# Patient Record
Sex: FEMALE | Race: BLACK OR AFRICAN AMERICAN | NOT HISPANIC OR LATINO | Employment: PART TIME | URBAN - METROPOLITAN AREA
[De-identification: names, ages, dates, MRNs, and addresses within clinical notes are randomized per-mention and may not be internally consistent; named-entity substitution may affect disease eponyms.]

---

## 2017-09-28 ENCOUNTER — HOSPITAL ENCOUNTER (OUTPATIENT)
Dept: RADIOLOGY | Facility: HOSPITAL | Age: 67
Discharge: HOME | End: 2017-09-28
Attending: PODIATRIST
Payer: MEDICARE

## 2017-09-28 ENCOUNTER — OFFICE VISIT (OUTPATIENT)
Dept: PODIATRY | Facility: CLINIC | Age: 67
End: 2017-09-28
Payer: MEDICARE

## 2017-09-28 VITALS
BODY MASS INDEX: 36.09 KG/M2 | DIASTOLIC BLOOD PRESSURE: 79 MMHG | WEIGHT: 203.69 LBS | HEART RATE: 79 BPM | HEIGHT: 63 IN | SYSTOLIC BLOOD PRESSURE: 128 MMHG

## 2017-09-28 DIAGNOSIS — S93.509A TOE SPRAIN, INITIAL ENCOUNTER: Primary | ICD-10-CM

## 2017-09-28 DIAGNOSIS — M79.672 LEFT FOOT PAIN: Primary | ICD-10-CM

## 2017-09-28 DIAGNOSIS — E11.9 COMPREHENSIVE DIABETIC FOOT EXAMINATION, TYPE 2 DM, ENCOUNTER FOR: ICD-10-CM

## 2017-09-28 DIAGNOSIS — M79.672 LEFT FOOT PAIN: ICD-10-CM

## 2017-09-28 PROCEDURE — 73630 X-RAY EXAM OF FOOT: CPT | Mod: TC,PO,LT

## 2017-09-28 PROCEDURE — 99999 PR PBB SHADOW E&M-NEW PATIENT-LVL III: CPT | Mod: PBBFAC,,, | Performed by: PODIATRIST

## 2017-09-28 PROCEDURE — 99203 OFFICE O/P NEW LOW 30 MIN: CPT | Mod: S$GLB,,, | Performed by: PODIATRIST

## 2017-09-28 PROCEDURE — 73630 X-RAY EXAM OF FOOT: CPT | Mod: 26,LT,, | Performed by: RADIOLOGY

## 2017-09-28 RX ORDER — TRIAMCINOLONE ACETONIDE 55 UG/1
1 SPRAY, METERED NASAL
COMMUNITY
Start: 2017-03-07

## 2017-09-28 RX ORDER — ATORVASTATIN CALCIUM 40 MG/1
40 TABLET, FILM COATED ORAL
COMMUNITY
Start: 2017-05-22 | End: 2018-05-22

## 2017-09-28 RX ORDER — LAMOTRIGINE 25 MG/1
TABLET ORAL
COMMUNITY
Start: 2017-08-27 | End: 2024-01-02

## 2017-09-28 RX ORDER — PROPRANOLOL HYDROCHLORIDE 80 MG/1
TABLET ORAL
COMMUNITY
Start: 2017-08-21

## 2017-09-28 RX ORDER — ALBUTEROL SULFATE 90 UG/1
2 AEROSOL, METERED RESPIRATORY (INHALATION)
COMMUNITY
Start: 2016-07-07

## 2017-09-28 RX ORDER — CLONIDINE HYDROCHLORIDE 0.3 MG/1
0.3 TABLET ORAL
COMMUNITY
Start: 2016-09-02

## 2017-09-28 RX ORDER — POTASSIUM CHLORIDE 750 MG/1
10 TABLET, EXTENDED RELEASE ORAL
COMMUNITY
Start: 2017-05-01

## 2017-09-28 RX ORDER — IBUPROFEN 600 MG/1
600 TABLET ORAL EVERY 8 HOURS PRN
Qty: 30 TABLET | Refills: 0 | Status: SHIPPED | OUTPATIENT
Start: 2017-09-28

## 2017-09-28 RX ORDER — DEXLANSOPRAZOLE 60 MG/1
CAPSULE, DELAYED RELEASE ORAL
COMMUNITY
Start: 2017-07-17

## 2017-09-28 RX ORDER — ASPIRIN 81 MG/1
81 TABLET ORAL
COMMUNITY

## 2017-09-28 RX ORDER — AZELASTINE HCL 205.5 UG/1
205.5 SPRAY NASAL
COMMUNITY
Start: 2017-05-01

## 2017-09-28 RX ORDER — CLONAZEPAM 0.5 MG/1
TABLET ORAL
COMMUNITY
Start: 2017-08-15

## 2017-09-28 RX ORDER — MONTELUKAST SODIUM 10 MG/1
10 TABLET ORAL
COMMUNITY
Start: 2017-01-04 | End: 2018-01-04

## 2017-09-28 RX ORDER — LURASIDONE HYDROCHLORIDE 20 MG/1
100 TABLET, FILM COATED ORAL
COMMUNITY
Start: 2015-12-29 | End: 2021-12-28 | Stop reason: SDUPTHER

## 2017-09-28 RX ORDER — INSULIN ASPART 100 [IU]/ML
35 INJECTION, SOLUTION INTRAVENOUS; SUBCUTANEOUS
COMMUNITY
Start: 2016-02-29

## 2017-09-28 RX ORDER — LURASIDONE HYDROCHLORIDE 80 MG/1
TABLET, FILM COATED ORAL
COMMUNITY
Start: 2016-08-23

## 2017-09-28 RX ORDER — HYDROCHLOROTHIAZIDE 25 MG/1
TABLET ORAL
COMMUNITY
Start: 2017-08-19

## 2017-09-28 RX ORDER — AMITRIPTYLINE HYDROCHLORIDE 100 MG/1
TABLET ORAL
COMMUNITY
Start: 2017-07-17

## 2017-09-28 RX ORDER — LAMOTRIGINE 200 MG/1
TABLET ORAL
COMMUNITY
Start: 2017-08-27

## 2017-09-28 NOTE — PROGRESS NOTES
[facility] -   PODIATRIC MEDICINE AND SURGERY  PROGRESS NOTE  10/3/2017    PODIATRY NOTE  PCP: Dr. Augustin Rosales MD    CHIEF COMPLAINT   Chief Complaint   Patient presents with    Foot Pain     Left hallux pain. Mild swelling noted. X-rays at [facility] negative for fracture per pt. Completed Clindamycin 9/27/17       HPI  Veronica Lara is a 66 y.o. female who has a past medical history of Anxiety; Depression; Diabetes mellitus, type 2; and Hypertension.   Veronica presents to clinic today complaining of left great toe pain.    Patient describes pain as:   Location:left great toe  Quality: throbbing  Severity:8/10  Duration: two weeks; pt states she dropped a can on her toe two weeks ago. She went to [facility] and had xrays performed which was negative. She has been icing the area which has helped. She is taking pain medication. Symptoms are worse with ambulation and pressure.     Patient denies other pedal complaints at this time.    PCP: Dr. Rosales Last Visit: 9/2017    [facility]  Past Medical History:   Diagnosis Date    Anxiety     Depression     Diabetes mellitus, type 2     Hypertension        PROBLEM LIST  There are no active problems to display for this patient.      MEDS  No current outpatient prescriptions on file prior to visit.     No current facility-administered medications on file prior to visit.        Medication List with Changes/Refills   New Medications    IBUPROFEN (ADVIL,MOTRIN) 600 MG TABLET    Take 1 tablet (600 mg total) by mouth every 8 (eight) hours as needed for Pain.   Current Medications    ALBUTEROL 90 MCG/ACTUATION INHALER    Inhale 2 puffs into the lungs.    AMITRIPTYLINE (ELAVIL) 100 MG TABLET    TAKE 1 TABLET BY MOUTH NIGHTLY.    ASPIRIN (ECOTRIN) 81 MG EC TABLET    Take 81 mg by mouth.    ATORVASTATIN (LIPITOR) 40 MG TABLET    Take 40 mg by mouth.    AZELASTINE 0.15 % (205.5 MCG) SPRY    205.5 mcg by Nasal route.    CLONAZEPAM (KLONOPIN) 0.5 MG TABLET        CLONIDINE  "(CATAPRES) 0.3 MG TABLET    Take 0.3 mg by mouth.    DEXLANSOPRAZOLE (DEXILANT) 60 MG CAPSULE    TAKE 1 CAPSULE BY MOUTH DAILY.    HYDROCHLOROTHIAZIDE (HYDRODIURIL) 25 MG TABLET        INSULIN ASPART (NOVOLOG) 100 UNIT/ML INPN PEN    Inject 35 Units into the skin.    LAMOTRIGINE (LAMICTAL) 200 MG TABLET        LAMOTRIGINE (LAMICTAL) 25 MG TABLET        LINAGLIPTIN-METFORMIN (JENTADUETO) 2.5-1,000 MG TAB    TAKE 1 TABLET BY MOUTH 2 (TWO) TIMES DAILY.    LURASIDONE (LATUDA) 20 MG TAB TABLET    Take 100 mg by mouth.    LURASIDONE (LATUDA) 80 MG TAB TABLET    TAKE 1 TABLET BY MOUTH EVERY EVENING BETWEEN MEALS    MONTELUKAST (SINGULAIR) 10 MG TABLET    Take 10 mg by mouth.    POTASSIUM CHLORIDE SA (K-DUR,KLOR-CON) 10 MEQ TABLET    Take 10 mEq by mouth.    PROPRANOLOL (INDERAL) 80 MG TABLET    TAKE 1 TABLET BY MOUTH 2 (TWO) TIMES DAILY.    TRIAMCINOLONE (NASACORT) 55 MCG NASAL INHALER    1 spray.       PSH   History reviewed. No pertinent surgical history.     ALL  Review of patient's allergies indicates:  No Known Allergies    SOC     Social History   Substance Use Topics    Smoking status: Never Smoker    Smokeless tobacco: Never Used    Alcohol use Not on file         FAMILY HX  History reviewed. No pertinent family history.         REVIEW OF SYSTEMS  Constitutional: Negative for chills and fever.   Respiratory: Negative for shortness of breath.    Cardiovascular: Negative for chest pain, palpitations, orthopnea, claudication and leg swelling.   Gastrointestinal: Negative for diarrhea, nausea and vomiting.   Musculoskeletal: positive for joint pain   Skin: Negative for rash.   Neurological: Negative for dizziness, tingling, sensory change, focal weakness and weakness.   Psychiatric/Behavioral: Negative.    PHYSICAL EXAM  Vitals:    09/28/17 1620   BP: 128/79   Pulse: 79   Weight: 92.4 kg (203 lb 11.3 oz)   Height: 5' 3" (1.6 m)   PainSc: 0-No pain       General: This patient is well-developed, well-nourished and " appears stated age, well-oriented to person, place and time, and cooperative and pleasant on today's visit      LOWER EXTREMITY  Vascular exam:   · Dorsalis pedis and posterior tibial pulses palpable 2/4 bilaterally.   · Capillary refill time immediate to the toes.   · Feet are warm to the touch. Skin temperature warm to warm from proximally to distally   · There are no varicosities, telangiectasias noted to bilateral foot and ankle regions.   · There are no ecchymoses noted to bilateral foot and ankle regions.   · There is mild edema left hallux    Dermatologic exam:   · Skin moist with healthy texture and turgor.  · There are no open ulcerations, lacerations, or fissures to bilateral foot and ankle regions. There are no signs of infection as there is no erythema, no proximal-extending lymphangiitis, no fluctuance, or crepitus noted on palpation to bilateral foot and ankle regions.   · There is no interdigital maceration.   · There are no hyperkeratotic lesions noted to feet. Nails are well-trimmed.    Neurologic exam:  · Epicritic sensation is intact as the patient is able to sense light touch to bilateral foot and ankle regions.   · Achilles and patellar deep tendon reflexes intact  · Babinski reflex absent    Musculoskeletal/Orthopedic exam:   · No symptomatic structural abnormalities noted  · Muscle strength AT/EHL/EDL/PT: 5/5; Achilles/Gastroc/Soleus: 5/5; PB/PL: 5/5 Muscle tone is normal.  · Ankle joint ROM  B/L supple DF/PF, non-crepitus  · STJ ROM supple inv/ev, non crepitus   · There is pain with palpation of left hallyx    IMAGING   Reviewed by me and I agree with radiologist findings, 3 views of foot/ankle, reveal: negative for fracture or dislocation      ASSESSMENT  Encounter Diagnoses   Name Primary?    Toe sprain, initial encounter - Left Foot Yes    Comprehensive diabetic foot examination, type 2 DM, encounter for          PLAN  1. Patient was educated about clinical and imaging findings, and  verbalizes understanding of above.     Diagnoses and all orders for this visit:  Toe sprain, initial encounter - Left Foot    Comprehensive diabetic foot examination, type 2 DM, encounter for    Other orders  -     ibuprofen (ADVIL,MOTRIN) 600 MG tablet; Take 1 tablet (600 mg total) by mouth every 8 (eight) hours as needed for Pain.  Dispense: 30 tablet; Refill: 0      2. Treatment plan: Discussed toe sprain, PRICE therapy, and surgical shoe. Instructed patient on buddy splintage of affected digit for 3 weeks. Ibuprofen PRN pain. If symptoms persists, return to clinic.     3. RTC  for follow up/evaluation as scheduled       No future appointments.    Report Electronically Signed By:  Nan Patrick DPM   Podiatric Medicine & Surgery  [facility] Panchal  10/3/2017

## 2017-11-17 ENCOUNTER — TELEPHONE (OUTPATIENT)
Dept: PODIATRY | Facility: CLINIC | Age: 67
End: 2017-11-17

## 2017-11-17 NOTE — TELEPHONE ENCOUNTER
Ms. Veronica Lara was given a return call, and informed that her appointment on 12/11/2017 at the [facility] location for 2:20pm is Dr. Cardoso next available appointment. Ms. Marco verbalized understanding, and the call ended well.

## 2017-11-17 NOTE — TELEPHONE ENCOUNTER
----- Message from Miriam Barron sent at 11/17/2017 12:10 PM CST -----  pls work pt in before 12/11 (not getting any better), doesn't want to see deshawn..[phone] (home)

## 2017-12-11 ENCOUNTER — LAB VISIT (OUTPATIENT)
Dept: LAB | Facility: HOSPITAL | Age: 67
End: 2017-12-11
Attending: PODIATRIST
Payer: MEDICARE

## 2017-12-11 ENCOUNTER — OFFICE VISIT (OUTPATIENT)
Dept: PODIATRY | Facility: CLINIC | Age: 67
End: 2017-12-11
Payer: MEDICARE

## 2017-12-11 VITALS
WEIGHT: 205.81 LBS | HEIGHT: 63 IN | SYSTOLIC BLOOD PRESSURE: 150 MMHG | HEART RATE: 82 BPM | BODY MASS INDEX: 36.46 KG/M2 | DIASTOLIC BLOOD PRESSURE: 87 MMHG

## 2017-12-11 DIAGNOSIS — M10.072 IDIOPATHIC GOUT OF LEFT FOOT, UNSPECIFIED CHRONICITY: ICD-10-CM

## 2017-12-11 DIAGNOSIS — S90.112S CONTUSION OF LEFT GREAT TOE WITHOUT DAMAGE TO NAIL, SEQUELA: ICD-10-CM

## 2017-12-11 DIAGNOSIS — M10.072 IDIOPATHIC GOUT OF LEFT FOOT, UNSPECIFIED CHRONICITY: Primary | ICD-10-CM

## 2017-12-11 LAB
CRP SERPL-MCNC: 2.8 MG/L
ERYTHROCYTE [SEDIMENTATION RATE] IN BLOOD BY WESTERGREN METHOD: 28 MM/HR
URATE SERPL-MCNC: 7.5 MG/DL

## 2017-12-11 PROCEDURE — 84550 ASSAY OF BLOOD/URIC ACID: CPT

## 2017-12-11 PROCEDURE — 86140 C-REACTIVE PROTEIN: CPT

## 2017-12-11 PROCEDURE — 99999 PR PBB SHADOW E&M-EST. PATIENT-LVL III: CPT | Mod: PBBFAC,,, | Performed by: PODIATRIST

## 2017-12-11 PROCEDURE — 99213 OFFICE O/P EST LOW 20 MIN: CPT | Mod: S$GLB,,, | Performed by: PODIATRIST

## 2017-12-11 PROCEDURE — 36415 COLL VENOUS BLD VENIPUNCTURE: CPT

## 2017-12-11 PROCEDURE — 85651 RBC SED RATE NONAUTOMATED: CPT

## 2017-12-11 NOTE — PROGRESS NOTES
Subjective:     Patient ID: Veronica Lara is a 67 y.o. female.    Chief Complaint: Toe Pain (diabetic patient, left great toe, pain was described as throbbing, patient stated a can fell on her toe about 7mths ago)    Veronica is a 67 y.o. female who presents to the podiatry clinic  with complaint of  left foot pain. Onset of the symptoms was several months ago. Precipitating event: dropped a can a green beans about 3-4 months ago. Current symptoms include: ability to bear weight, but with some pain and swelling. Aggravating factors: standing and walking. Symptoms have been intermittent. Patient has had no prior foot problems. Evaluation to date: plain films: normal. Treatment to date:ice and ibuprofen. Patients rates pain 3/10 on pain scale.        Patient Active Problem List   Diagnosis    Anxiety    Depression    Hypertension    Diabetes mellitus, type 2       Medication List with Changes/Refills   Current Medications    ALBUTEROL 90 MCG/ACTUATION INHALER    Inhale 2 puffs into the lungs.    AMITRIPTYLINE (ELAVIL) 100 MG TABLET    TAKE 1 TABLET BY MOUTH NIGHTLY.    ASPIRIN (ECOTRIN) 81 MG EC TABLET    Take 81 mg by mouth.    ATORVASTATIN (LIPITOR) 40 MG TABLET    Take 40 mg by mouth.    AZELASTINE 0.15 % (205.5 MCG) SPRY    205.5 mcg by Nasal route.    CLONAZEPAM (KLONOPIN) 0.5 MG TABLET        CLONIDINE (CATAPRES) 0.3 MG TABLET    Take 0.3 mg by mouth.    DEXLANSOPRAZOLE (DEXILANT) 60 MG CAPSULE    TAKE 1 CAPSULE BY MOUTH DAILY.    HYDROCHLOROTHIAZIDE (HYDRODIURIL) 25 MG TABLET        IBUPROFEN (ADVIL,MOTRIN) 600 MG TABLET    Take 1 tablet (600 mg total) by mouth every 8 (eight) hours as needed for Pain.    INSULIN ASPART (NOVOLOG) 100 UNIT/ML INPN PEN    Inject 35 Units into the skin.    LAMOTRIGINE (LAMICTAL) 200 MG TABLET        LAMOTRIGINE (LAMICTAL) 25 MG TABLET        LINAGLIPTIN-METFORMIN (JENTADUETO) 2.5-1,000 MG TAB    TAKE 1 TABLET BY MOUTH 2 (TWO) TIMES DAILY.    LURASIDONE (LATUDA) 20 MG TAB TABLET     "Take 100 mg by mouth.    LURASIDONE (LATUDA) 80 MG TAB TABLET    TAKE 1 TABLET BY MOUTH EVERY EVENING BETWEEN MEALS    MONTELUKAST (SINGULAIR) 10 MG TABLET    Take 10 mg by mouth.    POTASSIUM CHLORIDE SA (K-DUR,KLOR-CON) 10 MEQ TABLET    Take 10 mEq by mouth.    PROPRANOLOL (INDERAL) 80 MG TABLET    TAKE 1 TABLET BY MOUTH 2 (TWO) TIMES DAILY.    TRIAMCINOLONE (NASACORT) 55 MCG NASAL INHALER    1 spray.       Review of patient's allergies indicates:  No Known Allergies    History reviewed. No pertinent surgical history.    History reviewed. No pertinent family history.    Social History     Social History    Marital status:      Spouse name: N/A    Number of children: N/A    Years of education: N/A     Occupational History    Not on file.     Social History Main Topics    Smoking status: Never Smoker    Smokeless tobacco: Never Used    Alcohol use Not on file    Drug use: Unknown    Sexual activity: Not on file     Other Topics Concern    Not on file     Social History Narrative    No narrative on file       Vitals:    12/11/17 1533   BP: (!) 150/87   Pulse: 82   Weight: 93.3 kg (205 lb 12.8 oz)   Height: 5' 3" (1.6 m)   PainSc:   7       Review of Systems   Constitutional: Negative for chills and fever.   Respiratory: Negative for shortness of breath.    Cardiovascular: Negative for chest pain, palpitations, orthopnea, claudication and leg swelling.   Gastrointestinal: Negative for diarrhea, nausea and vomiting.   Musculoskeletal: Negative for joint pain.   Skin: Negative for rash.   Neurological: Negative for dizziness, tingling, sensory change, focal weakness and weakness.   Psychiatric/Behavioral: Negative.          Objective:       PHYSICAL EXAM: Apperance: Alert and orient in no distress,well developed, and with good attention to grooming and body habits  Patient presents ambulating in causal shoes.   Lower Extremity Physical Exam:  VASCULAR: Dorsalis pedis pulses 2/4 bilateral and Posterior " Tibial pulses 2/4 bilateral. Capillary fill time <4 seconds bilateral. Minimal edema observed bilateral. Varicosities absent bilateral. Skin temperature of the lower extremities is warm to warm, proximal to distal. Hair growth WNL bilateral.  DERMATOLOGICAL: No skin rashes, subcutaneous nodules, lesions, or ulcers observed bilateral. (+)edema left 1st MPJ  NEUROLOGICAL: Light touch, sharp-dull, proprioception all present and equal bilaterally.    MUSCULOSKELETAL: Muscle strength is 5/5 for foot inverters, everters, plantarflexors, and dorsiflexors. Muscle tone is normal. (+) pain on palpation of left 1st MPJ ROM.          Assessment:       Encounter Diagnoses   Name Primary?    Idiopathic gout of left foot, unspecified chronicity Yes    Contusion of left great toe without damage to nail, sequela          Plan:   Idiopathic gout of left foot, unspecified chronicity  -     Uric acid; Future; Expected date: 12/11/2017  -     C-reactive protein; Future; Expected date: 12/11/2017  -     Sedimentation rate, manual; Future; Expected date: 12/11/2017    Contusion of left great toe without damage to nail, sequela      I counseled the patient on her conditions, regarding findings of my examination, my impressions, and usual treatment plan.   I explained to the patient that etiologies and treatment options for gout including rest, elevation, diet control, NSAID's, long term gout medication, and/or injection therapy.  Prescription written for Colchicine 0.6mg to be taken as prescribed.  Ordered uric acid, crp, and esr testing to be completed today.    Patient to return in 6 weeks or sooner if needed.                   Sukhwinder Cardoso DPM  [facility]

## 2017-12-12 PROBLEM — F41.9 ANXIETY: Status: ACTIVE | Noted: 2017-12-12

## 2017-12-12 PROBLEM — I10 HYPERTENSION: Status: ACTIVE | Noted: 2017-12-12

## 2017-12-12 PROBLEM — F32.A DEPRESSION: Status: ACTIVE | Noted: 2017-12-12

## 2017-12-12 PROBLEM — E11.9 DIABETES MELLITUS, TYPE 2: Status: ACTIVE | Noted: 2017-12-12

## 2017-12-12 RX ORDER — METHYLPREDNISOLONE 4 MG/1
4 TABLET ORAL DAILY
Qty: 1 PACKAGE | Refills: 0 | Status: SHIPPED | OUTPATIENT
Start: 2017-12-12 | End: 2024-01-02

## 2017-12-12 RX ORDER — COLCHICINE 0.6 MG/1
0.6 TABLET ORAL DAILY
Qty: 6 TABLET | Refills: 0 | Status: SHIPPED | OUTPATIENT
Start: 2017-12-12 | End: 2017-12-13

## 2019-12-12 ENCOUNTER — OUTSIDE PLACE OF SERVICE (OUTPATIENT)
Dept: CARDIOLOGY | Facility: CLINIC | Age: 69
End: 2019-12-12
Payer: MEDICARE

## 2019-12-12 PROCEDURE — 93010 PR ELECTROCARDIOGRAM REPORT: ICD-10-PCS | Mod: S$GLB,,, | Performed by: STUDENT IN AN ORGANIZED HEALTH CARE EDUCATION/TRAINING PROGRAM

## 2019-12-12 PROCEDURE — 93010 ELECTROCARDIOGRAM REPORT: CPT | Mod: S$GLB,,, | Performed by: STUDENT IN AN ORGANIZED HEALTH CARE EDUCATION/TRAINING PROGRAM

## 2021-05-22 ENCOUNTER — OUTSIDE PLACE OF SERVICE (OUTPATIENT)
Dept: CARDIOLOGY | Facility: CLINIC | Age: 71
End: 2021-05-22
Payer: MEDICARE

## 2021-05-22 PROCEDURE — 93010 PR ELECTROCARDIOGRAM REPORT: ICD-10-PCS | Mod: ,,, | Performed by: INTERNAL MEDICINE

## 2021-05-22 PROCEDURE — 93010 ELECTROCARDIOGRAM REPORT: CPT | Mod: ,,, | Performed by: INTERNAL MEDICINE
